# Patient Record
Sex: MALE | Race: WHITE | Employment: STUDENT | URBAN - METROPOLITAN AREA
[De-identification: names, ages, dates, MRNs, and addresses within clinical notes are randomized per-mention and may not be internally consistent; named-entity substitution may affect disease eponyms.]

---

## 2023-11-30 ENCOUNTER — HOSPITAL ENCOUNTER (EMERGENCY)
Age: 23
Discharge: HOME | End: 2023-11-30
Attending: EMERGENCY MEDICINE
Payer: COMMERCIAL

## 2023-11-30 ENCOUNTER — APPOINTMENT (OUTPATIENT)
Dept: CT IMAGING | Age: 23
End: 2023-11-30
Payer: COMMERCIAL

## 2023-11-30 VITALS
OXYGEN SATURATION: 100 % | SYSTOLIC BLOOD PRESSURE: 143 MMHG | HEIGHT: 75 IN | DIASTOLIC BLOOD PRESSURE: 77 MMHG | HEART RATE: 74 BPM | WEIGHT: 241.4 LBS | RESPIRATION RATE: 18 BRPM | TEMPERATURE: 98.7 F | BODY MASS INDEX: 30.02 KG/M2

## 2023-11-30 DIAGNOSIS — R10.32 LEFT LOWER QUADRANT ABDOMINAL PAIN: Primary | ICD-10-CM

## 2023-11-30 LAB
ALBUMIN SERPL-MCNC: 4.9 G/DL (ref 3.4–5)
ALBUMIN/GLOB SERPL: 2 {RATIO} (ref 1.1–2.2)
ALP SERPL-CCNC: 79 U/L (ref 40–129)
ALT SERPL-CCNC: 35 U/L (ref 10–40)
ANION GAP SERPL CALCULATED.3IONS-SCNC: 9 MMOL/L (ref 3–16)
AST SERPL-CCNC: 24 U/L (ref 15–37)
BASOPHILS # BLD: 0 K/UL (ref 0–0.2)
BASOPHILS NFR BLD: 0.4 %
BILIRUB SERPL-MCNC: 0.7 MG/DL (ref 0–1)
BILIRUB UR QL STRIP.AUTO: NEGATIVE
BUN SERPL-MCNC: 14 MG/DL (ref 7–20)
CALCIUM SERPL-MCNC: 9.9 MG/DL (ref 8.3–10.6)
CHLORIDE SERPL-SCNC: 104 MMOL/L (ref 99–110)
CLARITY UR: CLEAR
CO2 SERPL-SCNC: 27 MMOL/L (ref 21–32)
COLOR UR: YELLOW
CREAT SERPL-MCNC: 0.9 MG/DL (ref 0.9–1.3)
DEPRECATED RDW RBC AUTO: 13.2 % (ref 12.4–15.4)
EOSINOPHIL # BLD: 0.2 K/UL (ref 0–0.6)
EOSINOPHIL NFR BLD: 3.9 %
GFR SERPLBLD CREATININE-BSD FMLA CKD-EPI: >60 ML/MIN/{1.73_M2}
GLUCOSE SERPL-MCNC: 93 MG/DL (ref 70–99)
GLUCOSE UR STRIP.AUTO-MCNC: NEGATIVE MG/DL
HCT VFR BLD AUTO: 43.1 % (ref 40.5–52.5)
HGB BLD-MCNC: 15.1 G/DL (ref 13.5–17.5)
HGB UR QL STRIP.AUTO: NEGATIVE
KETONES UR STRIP.AUTO-MCNC: NEGATIVE MG/DL
LEUKOCYTE ESTERASE UR QL STRIP.AUTO: NEGATIVE
LYMPHOCYTES # BLD: 1.8 K/UL (ref 1–5.1)
LYMPHOCYTES NFR BLD: 34 %
MCH RBC QN AUTO: 30.5 PG (ref 26–34)
MCHC RBC AUTO-ENTMCNC: 35 G/DL (ref 31–36)
MCV RBC AUTO: 87.1 FL (ref 80–100)
MONOCYTES # BLD: 0.6 K/UL (ref 0–1.3)
MONOCYTES NFR BLD: 10.5 %
NEUTROPHILS # BLD: 2.7 K/UL (ref 1.7–7.7)
NEUTROPHILS NFR BLD: 51.2 %
NITRITE UR QL STRIP.AUTO: NEGATIVE
PH UR STRIP.AUTO: 6.5 [PH] (ref 5–8)
PLATELET # BLD AUTO: 217 K/UL (ref 135–450)
PMV BLD AUTO: 7.7 FL (ref 5–10.5)
POTASSIUM SERPL-SCNC: 4.2 MMOL/L (ref 3.5–5.1)
PROT SERPL-MCNC: 7.4 G/DL (ref 6.4–8.2)
PROT UR STRIP.AUTO-MCNC: NEGATIVE MG/DL
RBC # BLD AUTO: 4.94 M/UL (ref 4.2–5.9)
SODIUM SERPL-SCNC: 140 MMOL/L (ref 136–145)
SP GR UR STRIP.AUTO: 1.01 (ref 1–1.03)
UA COMPLETE W REFLEX CULTURE PNL UR: NORMAL
UA DIPSTICK W REFLEX MICRO PNL UR: NORMAL
URN SPEC COLLECT METH UR: NORMAL
UROBILINOGEN UR STRIP-ACNC: 0.2 E.U./DL
WBC # BLD AUTO: 5.3 K/UL (ref 4–11)

## 2023-11-30 PROCEDURE — 6360000004 HC RX CONTRAST MEDICATION: Performed by: EMERGENCY MEDICINE

## 2023-11-30 PROCEDURE — 74177 CT ABD & PELVIS W/CONTRAST: CPT

## 2023-11-30 PROCEDURE — 2580000003 HC RX 258: Performed by: EMERGENCY MEDICINE

## 2023-11-30 PROCEDURE — 96374 THER/PROPH/DIAG INJ IV PUSH: CPT

## 2023-11-30 PROCEDURE — 6360000002 HC RX W HCPCS: Performed by: EMERGENCY MEDICINE

## 2023-11-30 PROCEDURE — 80053 COMPREHEN METABOLIC PANEL: CPT

## 2023-11-30 PROCEDURE — 85025 COMPLETE CBC W/AUTO DIFF WBC: CPT

## 2023-11-30 PROCEDURE — 81003 URINALYSIS AUTO W/O SCOPE: CPT

## 2023-11-30 PROCEDURE — 96375 TX/PRO/DX INJ NEW DRUG ADDON: CPT

## 2023-11-30 PROCEDURE — 99285 EMERGENCY DEPT VISIT HI MDM: CPT

## 2023-11-30 RX ORDER — MORPHINE SULFATE 4 MG/ML
4 INJECTION INTRAVENOUS ONCE
Status: COMPLETED | OUTPATIENT
Start: 2023-11-30 | End: 2023-11-30

## 2023-11-30 RX ORDER — 0.9 % SODIUM CHLORIDE 0.9 %
1000 INTRAVENOUS SOLUTION INTRAVENOUS ONCE
Status: COMPLETED | OUTPATIENT
Start: 2023-11-30 | End: 2023-11-30

## 2023-11-30 RX ORDER — ONDANSETRON 2 MG/ML
4 INJECTION INTRAMUSCULAR; INTRAVENOUS ONCE
Status: COMPLETED | OUTPATIENT
Start: 2023-11-30 | End: 2023-11-30

## 2023-11-30 RX ADMIN — ONDANSETRON 4 MG: 2 INJECTION INTRAMUSCULAR; INTRAVENOUS at 14:53

## 2023-11-30 RX ADMIN — MORPHINE SULFATE 4 MG: 4 INJECTION, SOLUTION INTRAMUSCULAR; INTRAVENOUS at 14:54

## 2023-11-30 RX ADMIN — SODIUM CHLORIDE 1000 ML: 9 INJECTION, SOLUTION INTRAVENOUS at 15:32

## 2023-11-30 RX ADMIN — IOPAMIDOL 75 ML: 755 INJECTION, SOLUTION INTRAVENOUS at 15:19

## 2023-11-30 ASSESSMENT — PAIN DESCRIPTION - DESCRIPTORS: DESCRIPTORS: ACHING

## 2023-11-30 ASSESSMENT — PAIN DESCRIPTION - LOCATION: LOCATION: ABDOMEN

## 2023-11-30 ASSESSMENT — PAIN SCALES - GENERAL
PAINLEVEL_OUTOF10: 5
PAINLEVEL_OUTOF10: 3

## 2023-11-30 ASSESSMENT — PAIN DESCRIPTION - PAIN TYPE: TYPE: ACUTE PAIN

## 2023-11-30 ASSESSMENT — PAIN DESCRIPTION - ORIENTATION: ORIENTATION: LOWER

## 2023-11-30 NOTE — DISCHARGE INSTRUCTIONS
Your lab work, urinalysis and CAT scan were all normal today. I suspect you have a stomach virus. This should resolve itself. Increase your fluid intake. You can alternate Tylenol and Motrin for pain. Advance your diet slowly. Please follow-up with your primary doctor next week. If you feel you are worsening, return to the ER.

## 2023-11-30 NOTE — ED NOTES
Patient given d/c instructions with return verbalization. Mother at bedside for instructions. Emphasis on f/u, to return with worsening s/s. Ambulates in room without difficulty. Declined WC. Ambulated to lobby with steady gait.      Justin Hutchins RN  11/30/23 2309

## 2023-11-30 NOTE — ED PROVIDER NOTES
[facility] Emergency Department      CHIEF COMPLAINT  Abdominal Pain      HISTORY OF PRESENT ILLNESS  Loan Hernandez is a 26 y.o. male who is otherwise healthy presents with nausea, vomiting and diarrhea with left lower quadrant abdominal pain. He states this started the day after Thanksgiving. He had some pain in his entire lower abdomen. He had several loose bowel movements and couple episodes of vomiting. The pain is now migrated more to the left lower abdomen. No fevers. No blood in his bowel movements or vomit. No pain radiating to the testicles. He has a history of an inguinal hernia repair as a child but no other abdominal surgeries. .   No other complaints, modifying factors or associated symptoms. History obtained from the patient. I have reviewed the following from the nursing documentation. No past medical history on file. No past surgical history on file. No family history on file. Social History     Socioeconomic History    Marital status: Single     Spouse name: Not on file    Number of children: Not on file    Years of education: Not on file    Highest education level: Not on file   Occupational History    Not on file   Tobacco Use    Smoking status: Never    Smokeless tobacco: Never   Vaping Use    Vaping Use: Never used   Substance and Sexual Activity    Alcohol use: Not on file    Drug use: Never    Sexual activity: Not on file   Other Topics Concern    Not on file   Social History Narrative    Not on file     Social Determinants of Health     Financial Resource Strain: Not on file   Food Insecurity: Not on file   Transportation Needs: Not on file   Physical Activity: Not on file   Stress: Not on file   Social Connections: Not on file   Intimate Partner Violence: Not on file   Housing Stability: Not on file     No current facility-administered medications for this encounter. No current outpatient medications on file.      Allergies   Allergen Reactions    Bactrim

## 2024-01-14 ENCOUNTER — HOSPITAL ENCOUNTER (EMERGENCY)
Age: 24
Discharge: HOME | End: 2024-01-14
Payer: COMMERCIAL

## 2024-01-14 VITALS
BODY MASS INDEX: 28.36 KG/M2 | HEART RATE: 98 BPM | DIASTOLIC BLOOD PRESSURE: 82 MMHG | WEIGHT: 232.9 LBS | RESPIRATION RATE: 14 BRPM | OXYGEN SATURATION: 97 % | SYSTOLIC BLOOD PRESSURE: 144 MMHG | TEMPERATURE: 98.3 F | HEIGHT: 76 IN

## 2024-01-14 DIAGNOSIS — T26.90XA CHEMICAL INJURY OF EYE, UNSPECIFIED LATERALITY, INITIAL ENCOUNTER: Primary | ICD-10-CM

## 2024-01-14 DIAGNOSIS — S00.259A: ICD-10-CM

## 2024-01-14 PROCEDURE — 6370000000 HC RX 637 (ALT 250 FOR IP): Performed by: PHYSICIAN ASSISTANT

## 2024-01-14 PROCEDURE — 2500000003 HC RX 250 WO HCPCS: Performed by: PHYSICIAN ASSISTANT

## 2024-01-14 PROCEDURE — 99282 EMERGENCY DEPT VISIT SF MDM: CPT

## 2024-01-14 RX ORDER — BALANCED SALT SOLUTION ENRICHED WITH BICARBONATE, DEXTROSE, AND GLUTATHIONE
KIT INTRAOCULAR
Status: DISCONTINUED
Start: 2024-01-14 | End: 2024-01-14 | Stop reason: HOSPADM

## 2024-01-14 RX ORDER — PROPARACAINE HYDROCHLORIDE 5 MG/ML
1 SOLUTION/ DROPS OPHTHALMIC ONCE
Status: COMPLETED | OUTPATIENT
Start: 2024-01-14 | End: 2024-01-14

## 2024-01-14 RX ADMIN — FLUORESCEIN SODIUM 2 MG: 1 STRIP OPHTHALMIC at 15:41

## 2024-01-14 RX ADMIN — PROPARACAINE HYDROCHLORIDE 1 DROP: 5 SOLUTION/ DROPS OPHTHALMIC at 15:41

## 2024-01-14 ASSESSMENT — VISUAL ACUITY
OU: 20/70
OS: 20/70
OD: 20/100

## 2024-01-14 ASSESSMENT — PAIN - FUNCTIONAL ASSESSMENT: PAIN_FUNCTIONAL_ASSESSMENT: NONE - DENIES PAIN

## 2024-01-14 NOTE — ED PROVIDER NOTES
Behavior: Behavior normal.             DIAGNOSTIC RESULTS   LABS:    Labs Reviewed - No data to display    When ordered only abnormal lab results are displayed. All other labs were within normal range or not returned as of this dictation.    EKG: When ordered, EKG's are interpreted by the Emergency Department Physician in the absence of a cardiologist.  Please see their note for interpretation of EKG.    RADIOLOGY:   Non-plain film images such as CT, Ultrasound and MRI are read by the radiologist. Plain radiographic images are visualized and preliminarily interpreted by the ED Provider with the below findings:        Interpretation per the Radiologist below, if available at the time of this note:    No orders to display     No results found.    No results found.    PROCEDURES   Unless otherwise noted below, none     Procedures    CRITICAL CARE TIME (.cctime)       PAST MEDICAL HISTORY      has no past medical history on file.     EMERGENCY DEPARTMENT COURSE and DIFFERENTIAL DIAGNOSIS/MDM:   Vitals:    Vitals:    01/14/24 1511   BP: (!) 144/82   Pulse: 98   Resp: 14   Temp: 98.3 °F (36.8 °C)   TempSrc: Oral   SpO2: 97%   Weight: 105.6 kg (232 lb 14.4 oz)   Height: 1.93 m (6' 4\")       Patient was given the following medications:  Medications   balanced salts plus (BSS) ophthalmic solution (has no administration in time range)   fluorescein ophthalmic strip 2 mg (2 mg Ophthalmic Given 1/14/24 1541)   proparacaine (ALCAINE) 0.5 % ophthalmic solution 1 drop (1 drop Ophthalmic Given 1/14/24 1541)             Is this patient to be included in the SEP-1 Core Measure due to severe sepsis or septic shock?   No   Exclusion criteria - the patient is NOT to be included for SEP-1 Core Measure due to:  Infection is not suspected    Chronic Conditions affecting care:    has no past medical history on file.    CONSULTS: (Who and What was discussed)  None      Social Determinants Significantly Affecting Health : None    Records

## 2024-01-14 NOTE — DISCHARGE INSTRUCTIONS
Follow-up with [facility].  Return to the emergency department for worsening pain in the eye, visual change, drainage of pus, color change or any worsening of symptoms or problems at home.

## 2024-01-15 NOTE — ED NOTES
1/15/24  Attempted to contact pt re: ED visit 1/14/24; message left, encouraged to return if any problems/concerns.

## 2024-07-14 ENCOUNTER — HOSPITAL ENCOUNTER (EMERGENCY)
Age: 24
Discharge: HOME | End: 2024-07-14
Payer: COMMERCIAL

## 2024-07-14 VITALS
DIASTOLIC BLOOD PRESSURE: 76 MMHG | OXYGEN SATURATION: 100 % | TEMPERATURE: 98.8 F | RESPIRATION RATE: 18 BRPM | HEART RATE: 80 BPM | BODY MASS INDEX: 26.1 KG/M2 | WEIGHT: 214.4 LBS | SYSTOLIC BLOOD PRESSURE: 130 MMHG

## 2024-07-14 DIAGNOSIS — S61.411A LACERATION OF RIGHT HAND WITHOUT FOREIGN BODY, INITIAL ENCOUNTER: Primary | ICD-10-CM

## 2024-07-14 PROCEDURE — 99283 EMERGENCY DEPT VISIT LOW MDM: CPT

## 2024-07-14 PROCEDURE — 12002 RPR S/N/AX/GEN/TRNK2.6-7.5CM: CPT

## 2024-07-14 RX ORDER — LIDOCAINE HYDROCHLORIDE AND EPINEPHRINE 10; 10 MG/ML; UG/ML
5 INJECTION, SOLUTION INFILTRATION; PERINEURAL ONCE
Status: DISCONTINUED | OUTPATIENT
Start: 2024-07-14 | End: 2024-07-14 | Stop reason: HOSPADM

## 2024-07-14 RX ORDER — BACITRACIN ZINC AND POLYMYXIN B SULFATE 500; 1000 [USP'U]/G; [USP'U]/G
OINTMENT TOPICAL ONCE
Status: DISCONTINUED | OUTPATIENT
Start: 2024-07-14 | End: 2024-07-14 | Stop reason: HOSPADM

## 2024-07-14 NOTE — ED PROVIDER NOTES
[facility]  EMERGENCY DEPARTMENT ENCOUNTER          PHYSICIAN ASSISTANT NOTE       Date of evaluation: 7/14/2024    Chief Complaint     Laceration (Pt. Presents to ED with c/o laceration to right thumb from a piece of metal at work. )      History of Present Illness     Cholo Monroe is a 23 y.o. male, otherwise healthy, who presents to the ED with complaints of a laceration to his right hand that he suffered just prior to arrival while at work when a piece of sheet metal slid against his skin.  He is left-hand dominant.  Tetanus is up-to-date.  He denies paresthesias, numbness, loss of range of motion or weakness in the digit.  Reports a mild burning discomfort with movement of his thumb.  He otherwise has no complaints.    ASSESSMENT / PLAN  (MEDICAL DECISION MAKING)     INITIAL VITALS: BP: 130/76, Temp: 98.8 °F (37.1 °C), Pulse: 80, Respirations: 18, SpO2: 100 %    Cholo Monroe is a 23 y.o. male presenting with a laceration to his right hand that was repaired per the procedure note.  He is given wound care instructions and will follow-up with his employee health provider or student health at his [place] for removal of the stitches in 1 week.  He will return for signs of infection or other worsening symptoms.      Medical Decision Making  Problems Addressed:  Laceration of right hand without foreign body, initial encounter: acute illness or injury    Risk  OTC drugs.  Prescription drug management.            Clinical Impression     1. Laceration of right hand without foreign body, initial encounter        Disposition     PATIENT REFERRED TO:  your employee health provider  Contact your  or employer for information.  In 1 week  For suture removal      DISCHARGE MEDICATIONS:  New Prescriptions    No medications on file       DISPOSITION Decision To Discharge 07/14/2024 06:10:58 PM        Diagnostic Results and Other Data     RECENT VITALS:  BP: 130/76, Temp: 98.8 °F (37.1 °C), Pulse: